# Patient Record
Sex: MALE | URBAN - METROPOLITAN AREA
[De-identification: names, ages, dates, MRNs, and addresses within clinical notes are randomized per-mention and may not be internally consistent; named-entity substitution may affect disease eponyms.]

---

## 2021-08-11 ENCOUNTER — HISTORICAL (OUTPATIENT)
Dept: ENDOSCOPY | Facility: HOSPITAL | Age: 71
End: 2021-08-11

## 2022-04-30 NOTE — H&P
Patient:   Logan De La Cruz             MRN: [id number]            FIN: [id number]               Age:   71 years     Sex:  Male     :  1950   Associated Diagnoses:   None   Author:   Morris Vasquez MD      CC: abdominal pain and change in bowel habits    Subjective: 71 year old with persistent abdominal pain with change in bowel habits defined as occasional blood, and diarrhea.  No weight loss.  Previous CT reassuring.  Otherwise, no new complaints.     Past Medical History:  HF, HTN    Review of Systems:  as above    Physical Exam:  General appearance: alert, cooperative, no distress  HENT: Normocephalic, atraumatic, neck symmetrical, no nasal discharge   Lungs: clear to auscultation bilaterally, symmetric chest wall expansion bilaterally  Heart: regular rate and rhythm without rub; no displacement of the PMI   Abdomen: soft, non-tender, non-distended, normal bowel sounds  Extremities: extremities symmetric; no clubbing, cyanosis, or edema  Neurologic: Alert and oriented X 3, normal strength, normal coordination and gait      Assessment:  71 year old with abdominal pain and diarrhea with occasional blood per rectum.  On Eliquis with heart history.     Plan:  EGD/Colon. Risks of procedure discussed with patient.